# Patient Record
Sex: FEMALE | Race: OTHER | NOT HISPANIC OR LATINO | URBAN - METROPOLITAN AREA
[De-identification: names, ages, dates, MRNs, and addresses within clinical notes are randomized per-mention and may not be internally consistent; named-entity substitution may affect disease eponyms.]

---

## 2017-10-23 ENCOUNTER — INPATIENT (INPATIENT)
Facility: HOSPITAL | Age: 32
LOS: 1 days | Discharge: HOME | DRG: 880 | End: 2017-10-25
Attending: PSYCHIATRY & NEUROLOGY | Admitting: PSYCHIATRY & NEUROLOGY
Payer: COMMERCIAL

## 2017-10-23 VITALS
WEIGHT: 160.06 LBS | SYSTOLIC BLOOD PRESSURE: 128 MMHG | HEART RATE: 112 BPM | OXYGEN SATURATION: 99 % | DIASTOLIC BLOOD PRESSURE: 87 MMHG | TEMPERATURE: 98 F | RESPIRATION RATE: 16 BRPM

## 2017-10-23 DIAGNOSIS — R41.3 OTHER AMNESIA: ICD-10-CM

## 2017-10-23 DIAGNOSIS — R51 HEADACHE: ICD-10-CM

## 2017-10-23 DIAGNOSIS — Z29.9 ENCOUNTER FOR PROPHYLACTIC MEASURES, UNSPECIFIED: ICD-10-CM

## 2017-10-23 DIAGNOSIS — R63.8 OTHER SYMPTOMS AND SIGNS CONCERNING FOOD AND FLUID INTAKE: ICD-10-CM

## 2017-10-23 DIAGNOSIS — R11.0 NAUSEA: ICD-10-CM

## 2017-10-23 LAB
ALBUMIN SERPL ELPH-MCNC: 4.4 G/DL — SIGNIFICANT CHANGE UP (ref 3.3–5)
ALP SERPL-CCNC: 79 U/L — SIGNIFICANT CHANGE UP (ref 40–120)
ALT FLD-CCNC: 22 U/L — SIGNIFICANT CHANGE UP (ref 10–45)
ANION GAP SERPL CALC-SCNC: 20 MMOL/L — HIGH (ref 5–17)
APPEARANCE UR: (no result)
AST SERPL-CCNC: 27 U/L — SIGNIFICANT CHANGE UP (ref 10–40)
BASOPHILS NFR BLD AUTO: 0.4 % — SIGNIFICANT CHANGE UP (ref 0–2)
BILIRUB SERPL-MCNC: 0.4 MG/DL — SIGNIFICANT CHANGE UP (ref 0.2–1.2)
BILIRUB UR-MCNC: (no result)
BUN SERPL-MCNC: 11 MG/DL — SIGNIFICANT CHANGE UP (ref 7–23)
CALCIUM SERPL-MCNC: 10.1 MG/DL — SIGNIFICANT CHANGE UP (ref 8.4–10.5)
CHLORIDE SERPL-SCNC: 98 MMOL/L — SIGNIFICANT CHANGE UP (ref 96–108)
CO2 SERPL-SCNC: 21 MMOL/L — LOW (ref 22–31)
COLOR SPEC: YELLOW — SIGNIFICANT CHANGE UP
CREAT SERPL-MCNC: 0.87 MG/DL — SIGNIFICANT CHANGE UP (ref 0.5–1.3)
DIFF PNL FLD: (no result)
EOSINOPHIL NFR BLD AUTO: 1.6 % — SIGNIFICANT CHANGE UP (ref 0–6)
GLUCOSE SERPL-MCNC: 99 MG/DL — SIGNIFICANT CHANGE UP (ref 70–99)
GLUCOSE UR QL: NEGATIVE — SIGNIFICANT CHANGE UP
HCG SERPL-ACNC: <.1 MIU/ML — SIGNIFICANT CHANGE UP
HCT VFR BLD CALC: 44 % — SIGNIFICANT CHANGE UP (ref 34.5–45)
HGB BLD-MCNC: 14.2 G/DL — SIGNIFICANT CHANGE UP (ref 11.5–15.5)
KETONES UR-MCNC: >=80 MG/DL
LACTATE SERPL-SCNC: 0.8 MMOL/L — SIGNIFICANT CHANGE UP (ref 0.5–2)
LEUKOCYTE ESTERASE UR-ACNC: (no result)
LYMPHOCYTES # BLD AUTO: 25.1 % — SIGNIFICANT CHANGE UP (ref 13–44)
MCHC RBC-ENTMCNC: 26.3 PG — LOW (ref 27–34)
MCHC RBC-ENTMCNC: 32.3 G/DL — SIGNIFICANT CHANGE UP (ref 32–36)
MCV RBC AUTO: 81.5 FL — SIGNIFICANT CHANGE UP (ref 80–100)
MONOCYTES NFR BLD AUTO: 5 % — SIGNIFICANT CHANGE UP (ref 2–14)
NEUTROPHILS NFR BLD AUTO: 67.9 % — SIGNIFICANT CHANGE UP (ref 43–77)
NITRITE UR-MCNC: NEGATIVE — SIGNIFICANT CHANGE UP
PCP SPEC-MCNC: SIGNIFICANT CHANGE UP
PH UR: 6 — SIGNIFICANT CHANGE UP (ref 5–8)
PLATELET # BLD AUTO: 319 K/UL — SIGNIFICANT CHANGE UP (ref 150–400)
POTASSIUM SERPL-MCNC: 4.2 MMOL/L — SIGNIFICANT CHANGE UP (ref 3.5–5.3)
POTASSIUM SERPL-SCNC: 4.2 MMOL/L — SIGNIFICANT CHANGE UP (ref 3.5–5.3)
PROT SERPL-MCNC: 8.8 G/DL — HIGH (ref 6–8.3)
PROT UR-MCNC: 30 MG/DL
RBC # BLD: 5.4 M/UL — HIGH (ref 3.8–5.2)
RBC # FLD: 13.1 % — SIGNIFICANT CHANGE UP (ref 10.3–16.9)
SODIUM SERPL-SCNC: 139 MMOL/L — SIGNIFICANT CHANGE UP (ref 135–145)
SP GR SPEC: 1.02 — SIGNIFICANT CHANGE UP (ref 1–1.03)
UROBILINOGEN FLD QL: 0.2 E.U./DL — SIGNIFICANT CHANGE UP
WBC # BLD: 10.2 K/UL — SIGNIFICANT CHANGE UP (ref 3.8–10.5)
WBC # FLD AUTO: 10.2 K/UL — SIGNIFICANT CHANGE UP (ref 3.8–10.5)

## 2017-10-23 PROCEDURE — 93010 ELECTROCARDIOGRAM REPORT: CPT

## 2017-10-23 PROCEDURE — 70551 MRI BRAIN STEM W/O DYE: CPT | Mod: 26

## 2017-10-23 PROCEDURE — 99285 EMERGENCY DEPT VISIT HI MDM: CPT | Mod: 25

## 2017-10-23 PROCEDURE — 70450 CT HEAD/BRAIN W/O DYE: CPT | Mod: 26

## 2017-10-23 RX ORDER — SODIUM CHLORIDE 9 MG/ML
1000 INJECTION INTRAMUSCULAR; INTRAVENOUS; SUBCUTANEOUS ONCE
Qty: 0 | Refills: 0 | Status: COMPLETED | OUTPATIENT
Start: 2017-10-23 | End: 2017-10-23

## 2017-10-23 RX ORDER — KETOROLAC TROMETHAMINE 30 MG/ML
30 SYRINGE (ML) INJECTION ONCE
Qty: 0 | Refills: 0 | Status: DISCONTINUED | OUTPATIENT
Start: 2017-10-23 | End: 2017-10-23

## 2017-10-23 RX ORDER — FAMOTIDINE 10 MG/ML
20 INJECTION INTRAVENOUS ONCE
Qty: 0 | Refills: 0 | Status: COMPLETED | OUTPATIENT
Start: 2017-10-23 | End: 2017-10-23

## 2017-10-23 RX ORDER — CEFTRIAXONE 500 MG/1
1 INJECTION, POWDER, FOR SOLUTION INTRAMUSCULAR; INTRAVENOUS ONCE
Qty: 0 | Refills: 0 | Status: COMPLETED | OUTPATIENT
Start: 2017-10-23 | End: 2017-10-23

## 2017-10-23 RX ORDER — KETOROLAC TROMETHAMINE 30 MG/ML
30 SYRINGE (ML) INJECTION EVERY 6 HOURS
Qty: 0 | Refills: 0 | Status: DISCONTINUED | OUTPATIENT
Start: 2017-10-23 | End: 2017-10-25

## 2017-10-23 RX ORDER — ONDANSETRON 8 MG/1
4 TABLET, FILM COATED ORAL EVERY 6 HOURS
Qty: 0 | Refills: 0 | Status: DISCONTINUED | OUTPATIENT
Start: 2017-10-23 | End: 2017-10-25

## 2017-10-23 RX ORDER — METOCLOPRAMIDE HCL 10 MG
10 TABLET ORAL ONCE
Qty: 0 | Refills: 0 | Status: COMPLETED | OUTPATIENT
Start: 2017-10-23 | End: 2017-10-23

## 2017-10-23 RX ORDER — ONDANSETRON 8 MG/1
4 TABLET, FILM COATED ORAL EVERY 6 HOURS
Qty: 0 | Refills: 0 | Status: DISCONTINUED | OUTPATIENT
Start: 2017-10-23 | End: 2017-10-23

## 2017-10-23 RX ADMIN — Medication 30 MILLIGRAM(S): at 22:22

## 2017-10-23 RX ADMIN — SODIUM CHLORIDE 1000 MILLILITER(S): 9 INJECTION INTRAMUSCULAR; INTRAVENOUS; SUBCUTANEOUS at 22:22

## 2017-10-23 RX ADMIN — Medication 10 MILLIGRAM(S): at 20:50

## 2017-10-23 RX ADMIN — FAMOTIDINE 20 MILLIGRAM(S): 10 INJECTION INTRAVENOUS at 20:49

## 2017-10-23 RX ADMIN — SODIUM CHLORIDE 1000 MILLILITER(S): 9 INJECTION INTRAMUSCULAR; INTRAVENOUS; SUBCUTANEOUS at 20:50

## 2017-10-23 RX ADMIN — CEFTRIAXONE 100 GRAM(S): 500 INJECTION, POWDER, FOR SOLUTION INTRAMUSCULAR; INTRAVENOUS at 22:22

## 2017-10-23 NOTE — ED ADULT NURSE NOTE - OBJECTIVE STATEMENT
c/o of right sided headache since friday. pt reports pain is strong and constant.  reports blurred vision and nausea. pt says her  notices that she has been forgetting things since Friday too where she seems to be confused. pt cannot remember any of her confusion episodes.  pt also has pain to epigastric area since friday that is non radiating.  denies dizziness, cp, sob.

## 2017-10-23 NOTE — ED PROVIDER NOTE - ATTENDING CONTRIBUTION TO CARE
32F with headache for 4 days, has had similar ha previously. states she has confusion for past 2 days; couldn't remember password to email. Alert and oriented x 3 currently. Answers questions appropriately. As per , pt fainted two days ago. Pt with bump on her forehead. Neuro exam now alert and oriented x 3, cn grossly intact, strength equal in all 4 ext, sensation intact to light touch, pt is not pregnant, gait steady, cerebellar fnf/hts intact. No travel, no URI sx, no fever.

## 2017-10-23 NOTE — H&P ADULT - PROBLEM SELECTOR PLAN 1
Began suddenly on Friday with associate nausea and blurry vision  - CT head negative for acute pathology or intracranial hemorrhage    - Awaiting MRI brain for further evaluation  - Continue Toradol for pain control Began suddenly on Friday with associate nausea and blurry vision  - CT head negative for acute pathology or intracranial hemorrhage  - Possibly new onset of migraine  - Awaiting MRI brain for further evaluation  - Continue Toradol for pain control Began suddenly on Friday with associated nausea and blurry vision  - CT head negative for acute pathology or intracranial hemorrhage  - Possibly new onset of migraine  - Awaiting MRI brain for further evaluation  - Continue Toradol for pain control

## 2017-10-23 NOTE — H&P ADULT - NSHPLABSRESULTS_GEN_ALL_CORE
14.2   10.2  )-----------( 319      ( 23 Oct 2017 18:55 )             44.0     10-23    139  |  98  |  11  ----------------------------<  99  4.2   |  21<L>  |  0.87    Ca    10.1      23 Oct 2017 18:55    TPro  8.8<H>  /  Alb  4.4  /  TBili  0.4  /  DBili  x   /  AST  27  /  ALT  22  /  AlkPhos  79  10-23      TSH 1.48    Lactate 0.8    UA: +leuk esterase +WBC +ketones    UTox negative 14.2   10.2  )-----------( 319      ( 23 Oct 2017 18:55 )             44.0     10-23    139  |  98  |  11  ----------------------------<  99  4.2   |  21<L>  |  0.87    Ca    10.1      23 Oct 2017 18:55    TPro  8.8<H>  /  Alb  4.4  /  TBili  0.4  /  DBili  x   /  AST  27  /  ALT  22  /  AlkPhos  79  10-23      TSH 1.48    Lactate 0.8    UA: +leuk esterase +WBC +ketones    UTox negative      Imaging:    CT head: 14.2   10.2  )-----------( 319      ( 23 Oct 2017 18:55 )             44.0     10-23    139  |  98  |  11  ----------------------------<  99  4.2   |  21<L>  |  0.87    Ca    10.1      23 Oct 2017 18:55    TPro  8.8<H>  /  Alb  4.4  /  TBili  0.4  /  DBili  x   /  AST  27  /  ALT  22  /  AlkPhos  79  10-23      TSH 1.48    Lactate 0.8    UA: +leuk esterase +WBC +ketones    UTox negative      Imaging:    CT head:  The CT examination demonstrates the ventricles, cisternal   spaces, and cortical sulci to be within normal limits. There is no   midline shift or extra axial collections. The gray white differentiation   appears within normal limits. There is no intracranial hemorrhage or   acute transcortical infarct. The bony windows demonstrates no fractures.   The visualized paranasal sinuses are within normal limits. The mastoid   air cells are well aerated.

## 2017-10-23 NOTE — ED PROVIDER NOTE - MEDICAL DECISION MAKING DETAILS
33 y/o f no pmh presents with headache, confusion; no focal deficits in ED, pt is A&Ox3 currently.  Will get labs, CT head.  Abdomen is nontender, will give pepcid.  F/u results and reassess.

## 2017-10-23 NOTE — ED PROVIDER NOTE - PSYCHIATRIC, MLM
Alert and oriented to person, place, time/situation. normal mood and affect. no apparent risk to self or others.
none

## 2017-10-23 NOTE — H&P ADULT - ATTENDING COMMENTS
Ms. Baca reports headache for the past few days, along with memory loss for events occurring after January 2015. She met her  after this date, and states she does not recognize him although has a "feeling" she knows him. Her mother told her she and her  have been arguing, and that the problem is "on his end". By this afternoon headache had resolved. CT and MRI were normal, as are vitals and labs. She had an episode of leg weakness several years ago, and was worked up with MRIs which did not show anything as far as she can remember. She was in medical school, but now works as a teacher. Her  told her that a few days ago she fainted / lost consciousness; she was upset that he didn't bring her to the hospital at that time. Neurologic exam is normal, except for MOCA 16/30, but on many items she did not appear to give full effort.     The characteristics of her memory loss are suspicious for a nonorganic etiology, given the abrupt temporal cut-off as well as the fact that she can remember things people told her in the past few days, but not events that occurred in the same period of time. Furthermore, the prior episode of leg weakness with normal imaging is suggestive of conversion disorder. Finally, there appears to be significant interpersonal conflict with her  which may have precipitated this episode.     Will get video EEG given reported loss of consciousness. Psychiatry consult and social work evaluation will also be requested.

## 2017-10-23 NOTE — H&P ADULT - FAMILY HISTORY
Uncle  Still living? Unknown  Family history of MI (myocardial infarction), Age at diagnosis: Age Unknown     Grandparent  Still living? Unknown  Family history of stroke, Age at diagnosis: Age Unknown  Family history of diabetes mellitus (DM), Age at diagnosis: Age Unknown

## 2017-10-23 NOTE — H&P ADULT - PROBLEM SELECTOR PLAN 4
SCDs UA +leuk esterase and +WBC and patient with lower abdominal pain  - Lactate 0.8  - Continue ceftriaxone 1g QD  - f/u urine culture UA +leuk esterase and +WBC and patient with lower abdominal pain  - Lactate 0.8, afebrile, no leukocytosis   - Continue ceftriaxone 1g QD  - f/u urine culture

## 2017-10-23 NOTE — ED PROVIDER NOTE - PROGRESS NOTE DETAILS
CT head neg, labs wnl, discussed case with on call neurology attending Dr. West, will admit, MRI ordered.

## 2017-10-23 NOTE — H&P ADULT - NSHPPHYSICALEXAM_GEN_ALL_CORE
PHYSICAL EXAM:      Constitutional:    Eyes:    ENMT:    Neck:    Breasts:    Back:    Respiratory:    Cardiovascular:    Gastrointestinal:    Genitourinary:    Rectal:    Extremities:    Vascular:    Neurological:    Skin:    Lymph Nodes:    Musculoskeletal:    Psychiatric: PHYSICAL EXAM:    Constitutional: laying in bed, NAD  Eyes: PERRLA  ENMT: MMM, no LAD, no JVD  Neck: no nuchal rigidity  Respiratory: CTABL  Cardiovascular: RRR, S1/S2, no murmurs  Gastrointestinal: +BS,     Genitourinary:    Rectal:    Extremities:    Vascular:    Neurological:    Skin:    Lymph Nodes:    Musculoskeletal:    Psychiatric: PHYSICAL EXAM:    Constitutional: laying in bed, NAD  Eyes: PERRLA  ENMT: MMM, no LAD, no JVD  Neck: no nuchal rigidity  Respiratory: CTABL  Cardiovascular: RRR, S1/S2, no murmurs  Gastrointestinal: +BS, tender to palpation in epigastrum and LLQ, no rebound or guarding  Extremities: WWP, no edema  Neurological: A&O x3, speech is clear and fluent  CN II: Visual fields are full to confrontation. Pupils are 4 mm and briskly reactive to light.   CN III, IV, VI: At primary gaze, there is no eye deviation. When the patient is looking to the left, the right eye does not adduct. When the patient is looking up, the right eye does not move up as well as the left. She develops horizontal diplopia in all directions of gaze especially when looking to the left. There is ptosis of the right eye. Convergence is impaired.  CN V: Facial sensation is decreased on the right in the region of the ophthalmic nerve.  CN VII: Face is symmetric with normal eye closure and smile.  CN VII: Hearing is normal to rubbing fingers.  CN IX, X: Palate elevates symmetrically. Phonation is normal.  CN XI: Head turning and shoulder shrug are intact  CN XII: Tongue is midline with normal movements and no atrophy.  Skin: no lesions or rashes PHYSICAL EXAM:    Constitutional: laying in bed, NAD  Eyes: PERRLA  ENMT: MMM, no LAD, no JVD  Neck: no nuchal rigidity  Respiratory: CTABL  Cardiovascular: RRR, S1/S2, no murmurs  Gastrointestinal: +BS, tender to palpation in epigastrum and LLQ, no rebound or guarding  Extremities: WWP, no edema  Neurological: A&O x3, speech is clear and fluent  CN II: Visual fields are full to confrontation. Pupils are 4 mm and briskly reactive to light.   CN III, IV, VI: At primary gaze, there is no eye deviation. When the patient is looking to the left, the right eye does not adduct. When the patient is looking up, the right eye does not move up as well as the left. She develops horizontal diplopia in all directions of gaze especially when looking to the left. There is ptosis of the right eye. Convergence is impaired.  CN V: Facial sensation is decreased on the right in the region of the ophthalmic nerve.  CN VII: Face is symmetric with normal eye closure and smile.  CN VII: Hearing is normal to rubbing fingers.  CN IX, X: Palate elevates symmetrically. Phonation is normal.  CN XI: Head turning and shoulder shrug are intact  CN XII: Tongue is midline with normal movements and no atrophy.  Motor: Muscle bulk and tone are normal. Strength is full bilaterally in all extremities.  Gait: deferred  Skin: no lesions or rashes

## 2017-10-23 NOTE — H&P ADULT - PROBLEM SELECTOR PLAN 2
- Began with headache onset  - Differential includes hypothyroidsim, depression - Began with headache onset  - Differential includes hypothyroidism, depression, vitamin deficiencies  - TSH normal  - Will check Vitamin b12 - Began with headache onset  - Broad differential including hypothyroidism, depression, vitamin deficiencies, medications, toxins, head trauma, seizure  - CT head normal, awaiting MRI  - Patient does not take any prescription medications  - Denies any drug use or known toxin ingestion  - Denies any head trauma  - No history of seizures  - TSH normal  - Will check Vitamin b12 - Began with headache onset  - Broad differential including hypothyroidism, depression, vitamin deficiencies, medications, toxins, head trauma, seizure  - CT head normal, awaiting MRI  - Patient does not take any prescription medications  - Denies any drug use or known toxin ingestion  - Denies any head trauma  - No history of seizures  - TSH normal  - Will check Vitamin B1 and B12

## 2017-10-23 NOTE — H&P ADULT - NSHPREVIEWOFSYSTEMS_GEN_ALL_CORE
REVIEW OF SYSTEMS:    CONSTITUTIONAL: No weakness, fevers or chills  EYES/ENT: No visual changes;  No vertigo or throat pain   NECK: No pain or stiffness  RESPIRATORY: No cough, wheezing, hemoptysis; No shortness of breath  CARDIOVASCULAR: No chest pain or palpitations  GASTROINTESTINAL: No abdominal or epigastric pain. No nausea, vomiting, or hematemesis; No diarrhea or constipation. No melena or hematochezia.  GENITOURINARY: No dysuria, frequency or hematuria  NEUROLOGICAL: No numbness or weakness  SKIN: No itching, burning, rashes, or lesions   All other review of systems is negative unless indicated above. REVIEW OF SYSTEMS:    CONSTITUTIONAL: No weakness, + fevers +chills  EYES/ENT: +blurry vision on right;  No vertigo or throat pain   NECK: No pain or stiffness  RESPIRATORY: No cough, wheezing, hemoptysis; No shortness of breath  CARDIOVASCULAR: No chest pain or palpitations  GASTROINTESTINAL: +epigastric pain, + nausea; No diarrhea or constipation. No melena or hematochezia.  GENITOURINARY: No dysuria, frequency or hematuria  NEUROLOGICAL: No numbness or weakness  SKIN: No itching, burning, rashes, or lesions   All other review of systems is negative unless indicated above.

## 2017-10-23 NOTE — H&P ADULT - ASSESSMENT
33 yo F with no significant PMH present with right-sided headache that began Friday associated with memory loss.

## 2017-10-23 NOTE — ED PROVIDER NOTE - OBJECTIVE STATEMENT
33 y/o f presents c/o headache for the past 4 days.  Pt stating she has gradual onset headache, more right than left, with nausea and photophobia.  Pt also reporting some confusion, forgetfulness.  Pt stating she couldn't remember meeting her , couldn't remember password to her email.  Pt also states her  told her she fainted 2 days ago at home, witnessed by .  Pt stating in ED, has headache which has been persistent despite taking ibuprofen.  Pt also stating she is having some epigastric pain, which has been present for the past 3 days, states her  told her she has been reporting it for several months.  Denies visual changes, dizziness, vomiting, fever, recent travel, all other ROS negative.

## 2017-10-23 NOTE — H&P ADULT - HISTORY OF PRESENT ILLNESS
31 yo F with no significant PMH present with headache that began Friday associated with memory loss.  Patient does not remember all the events surrounding her symptoms but states the headache came on suddenly on Friday and has gotten progressively worse since then.  She had one episode of NBNB vomiting when the HA began and has felt nausea and some abdominal pain since that time.  She says the headache is right-sided on the frontal aspect of her head and associated with blurry vision in the right eye and some tearing.  It is a 10/10 in severity at its worst.  She took ibuprofen at home without relief.  Also reports that she does not remember her  who is sitting at her bedside.  She cannot recall meeting him in March 2016 and does not remember their wedding.  She knows today's date because she has been told what it is but her memories since 2015 are blurry.  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  She also reports forgetting the password to her phone.  She was hospitalized in 2008 at [facility] for progressive weakness of her entire body and was worked up for Multiple sclerosis but work up has been negative.  She has had several MRIs of the spine which have been unrevealing per the patient.  She see a neurologist Dr. Tomy Collins but has not been diagnosed with any neurologic disease.  Per her , only travel has been to the [facility] in November 2016 and this past summer to [place].  Denies any sick contacts.  Patient reports feeling F/C today.  No dizziness, weakness, CP, SOB, diarrhea/constipation, or dysuria.      In ED, T 98  /87 RR 16 O2sat 99% on RA.  She received 2L NS bolus, Pepcid Toradol and Reglan.

## 2017-10-23 NOTE — ED ADULT TRIAGE NOTE - CHIEF COMPLAINT QUOTE
Pt with no PMH and not on daily meds presents to ED c/o R sided frontal HA since Friday, also states " my  told me I was confused and didn't remember how we met" pt also c/o  abdominal pain and has a mass to R forehead that she state she has had for a year

## 2017-10-24 DIAGNOSIS — N30.00 ACUTE CYSTITIS WITHOUT HEMATURIA: ICD-10-CM

## 2017-10-24 LAB
ANION GAP SERPL CALC-SCNC: 13 MMOL/L — SIGNIFICANT CHANGE UP (ref 5–17)
BUN SERPL-MCNC: 10 MG/DL — SIGNIFICANT CHANGE UP (ref 7–23)
CALCIUM SERPL-MCNC: 8.6 MG/DL — SIGNIFICANT CHANGE UP (ref 8.4–10.5)
CHLORIDE SERPL-SCNC: 102 MMOL/L — SIGNIFICANT CHANGE UP (ref 96–108)
CO2 SERPL-SCNC: 23 MMOL/L — SIGNIFICANT CHANGE UP (ref 22–31)
CREAT SERPL-MCNC: 0.73 MG/DL — SIGNIFICANT CHANGE UP (ref 0.5–1.3)
GLUCOSE SERPL-MCNC: 145 MG/DL — HIGH (ref 70–99)
HCT VFR BLD CALC: 41.5 % — SIGNIFICANT CHANGE UP (ref 34.5–45)
HGB BLD-MCNC: 13.1 G/DL — SIGNIFICANT CHANGE UP (ref 11.5–15.5)
HIV 1+2 AB+HIV1 P24 AG SERPL QL IA: SIGNIFICANT CHANGE UP
MAGNESIUM SERPL-MCNC: 2 MG/DL — SIGNIFICANT CHANGE UP (ref 1.6–2.6)
MCHC RBC-ENTMCNC: 26.1 PG — LOW (ref 27–34)
MCHC RBC-ENTMCNC: 31.6 G/DL — LOW (ref 32–36)
MCV RBC AUTO: 82.7 FL — SIGNIFICANT CHANGE UP (ref 80–100)
PLATELET # BLD AUTO: 276 K/UL — SIGNIFICANT CHANGE UP (ref 150–400)
POTASSIUM SERPL-MCNC: 3.9 MMOL/L — SIGNIFICANT CHANGE UP (ref 3.5–5.3)
POTASSIUM SERPL-SCNC: 3.9 MMOL/L — SIGNIFICANT CHANGE UP (ref 3.5–5.3)
RBC # BLD: 5.02 M/UL — SIGNIFICANT CHANGE UP (ref 3.8–5.2)
RBC # FLD: 13.3 % — SIGNIFICANT CHANGE UP (ref 10.3–16.9)
SODIUM SERPL-SCNC: 138 MMOL/L — SIGNIFICANT CHANGE UP (ref 135–145)
VIT B12 SERPL-MCNC: 479 PG/ML — SIGNIFICANT CHANGE UP (ref 243–894)
WBC # BLD: 6.2 K/UL — SIGNIFICANT CHANGE UP (ref 3.8–10.5)
WBC # FLD AUTO: 6.2 K/UL — SIGNIFICANT CHANGE UP (ref 3.8–10.5)

## 2017-10-24 PROCEDURE — 99223 1ST HOSP IP/OBS HIGH 75: CPT

## 2017-10-24 PROCEDURE — 95951: CPT | Mod: 26,52

## 2017-10-24 RX ORDER — CEFTRIAXONE 500 MG/1
1 INJECTION, POWDER, FOR SOLUTION INTRAMUSCULAR; INTRAVENOUS EVERY 24 HOURS
Qty: 0 | Refills: 0 | Status: DISCONTINUED | OUTPATIENT
Start: 2017-10-24 | End: 2017-10-25

## 2017-10-24 RX ORDER — INFLUENZA VIRUS VACCINE 15; 15; 15; 15 UG/.5ML; UG/.5ML; UG/.5ML; UG/.5ML
0.5 SUSPENSION INTRAMUSCULAR ONCE
Qty: 0 | Refills: 0 | Status: COMPLETED | OUTPATIENT
Start: 2017-10-24 | End: 2017-10-24

## 2017-10-24 RX ADMIN — CEFTRIAXONE 100 GRAM(S): 500 INJECTION, POWDER, FOR SOLUTION INTRAMUSCULAR; INTRAVENOUS at 21:47

## 2017-10-24 RX ADMIN — Medication 30 MILLIGRAM(S): at 00:02

## 2017-10-24 NOTE — PROGRESS NOTE ADULT - ASSESSMENT
31 yo F with no significant PMH present with right-sided headache that began Friday associated with memory loss. Imaging negative to date.

## 2017-10-24 NOTE — PROGRESS NOTE ADULT - PROBLEM SELECTOR PLAN 2
- Began with headache onset  - Broad differential including hypothyroidism, depression, vitamin deficiencies, medications, toxins, head trauma, seizure  - CT head, MRI wnl.  - Patient does not take any prescription medications  - Denies any drug use or known toxin ingestion  - Denies any head trauma  - No history of seizures  - TSH normal  - f/u Vitamin B1 and B12  - f/u VEEG.   - Psychiatry consulted, appreciate recs.  - SW consulted, some concern for unsafe home environment. -unlikely to be from organic etiology given characteristics  - CT head, MRI wnl.  - Patient does not take any prescription medications  - Denies any drug use or known toxin ingestion  - Denies any head trauma  - No history of seizures  - TSH normal  - f/u Vitamin B1 and B12  - f/u VEEG.   - Psychiatry consulted, appreciate recs.  - SW consulted, some concern for unsafe home environment.

## 2017-10-24 NOTE — PROGRESS NOTE ADULT - PROBLEM SELECTOR PLAN 4
UA +leuk esterase and +WBC and patient with lower abdominal pain  - Lactate 0.8, afebrile, no leukocytosis   - Continue ceftriaxone 1g QD  - f/u urine culture

## 2017-10-24 NOTE — PROGRESS NOTE ADULT - PROBLEM SELECTOR PLAN 1
Began suddenly on Friday with associated nausea and blurry vision  - CT head and MRI negative for acute pathology or intracranial hemorrhage  - Possibly new onset of migraine  - Headache resolved. Continue Toradol prn for pain control Began suddenly on Friday with associated nausea and blurry vision  - CT head and MRI negative for acute pathology or intracranial hemorrhage  - Headache resolved. Continue Toradol prn for pain control

## 2017-10-24 NOTE — PROGRESS NOTE ADULT - SUBJECTIVE AND OBJECTIVE BOX
SUBJECTIVE / INTERVAL HPI: Patient seen and examined at bedside.     VITAL SIGNS:  Vital Signs Last 24 Hrs  T(C): 36.1 (24 Oct 2017 12:03), Max: 96.7 (24 Oct 2017 01:35)  T(F): 97 (24 Oct 2017 12:03), Max: 206.1 (24 Oct 2017 01:35)  HR: 96 (24 Oct 2017 12:03) (74 - 112)  BP: 122/86 (24 Oct 2017 12:03) (111/69 - 128/87)  BP(mean): --  RR: 16 (24 Oct 2017 12:03) (15 - 18)  SpO2: 97% (24 Oct 2017 12:03) (97% - 99%)    PHYSICAL EXAM:    General: WDWN  HEENT: NC/AT; PERRL, clear conjunctiva  Neck: supple  Cardiovascular: +S1/S2; RRR  Respiratory: CTA b/l; no W/R/R  Gastrointestinal: soft, NT/ND; +BSx4  Extremities: WWP; 2+ peripheral pulses; no edema   Neurological: AAOx3; no focal deficits    MEDICATIONS:  MEDICATIONS  (STANDING):  cefTRIAXone   IVPB 1 Gram(s) IV Intermittent every 24 hours  influenza   Vaccine 0.5 milliLiter(s) IntraMuscular once    MEDICATIONS  (PRN):  ketorolac   Injectable 30 milliGRAM(s) IV Push every 6 hours PRN Severe Pain (7 - 10)  ondansetron Injectable 4 milliGRAM(s) IV Push every 6 hours PRN Nausea and/or Vomiting      ALLERGIES:  Allergies    No Known Allergies    Intolerances        LABS:                        13.1   6.2   )-----------( 276      ( 24 Oct 2017 08:48 )             41.5     10-    138  |  102  |  10  ----------------------------<  145<H>  3.9   |  23  |  0.73    Ca    8.6      24 Oct 2017 08:47  Mg     2.0     10-    TPro  8.8<H>  /  Alb  4.4  /  TBili  0.4  /  DBili  x   /  AST  27  /  ALT  22  /  AlkPhos  79  10-      Urinalysis Basic - ( 23 Oct 2017 21:21 )    Color: Yellow / Appearance: Cloudy / S.025 / pH: x  Gluc: x / Ketone: >=80 mg/dL  / Bili: Small / Urobili: 0.2 E.U./dL   Blood: x / Protein: 30 mg/dL / Nitrite: NEGATIVE   Leuk Esterase: Large / RBC: 5-10 /HPF / WBC Many /HPF   Sq Epi: x / Non Sq Epi: Moderate /HPF / Bacteria: Present /HPF      CAPILLARY BLOOD GLUCOSE      POCT Blood Glucose.: 98 mg/dL (23 Oct 2017 18:27)      RADIOLOGY & ADDITIONAL TESTS: Reviewed. SUBJECTIVE / INTERVAL HPI: No major overnight events. Patient seen and examined at bedside. Continues to report the same history of memory loss of specific events including meeting and marrying her . Reports minimal epigastric pain. Headache has improved. No F/C/N/V/Dizziness/Fatigue. ROS otherwise negative.    VITAL SIGNS:  Vital Signs Last 24 Hrs  T(C): 36.1 (24 Oct 2017 12:03), Max: 96.7 (24 Oct 2017 01:35)  T(F): 97 (24 Oct 2017 12:03), Max: 206.1 (24 Oct 2017 01:35)  HR: 96 (24 Oct 2017 12:03) (74 - 112)  BP: 122/86 (24 Oct 2017 12:03) (111/69 - 128/87)  BP(mean): --  RR: 16 (24 Oct 2017 12:03) (15 - 18)  SpO2: 97% (24 Oct 2017 12:03) (97% - 99%)    PHYSICAL EXAM:    General: WDWN  HEENT: NC/AT; PERRL, clear conjunctiva  Neck: supple  Cardiovascular: +S1/S2; RRR  Respiratory: CTA b/l; no W/R/R  Gastrointestinal: soft, NT. Mildly distended.; +BSx4  Extremities: WWP; 2+ peripheral pulses; no edema   Neurological: AAOx3; no focal deficits    MEDICATIONS:  MEDICATIONS  (STANDING):  cefTRIAXone   IVPB 1 Gram(s) IV Intermittent every 24 hours  influenza   Vaccine 0.5 milliLiter(s) IntraMuscular once    MEDICATIONS  (PRN):  ketorolac   Injectable 30 milliGRAM(s) IV Push every 6 hours PRN Severe Pain (7 - 10)  ondansetron Injectable 4 milliGRAM(s) IV Push every 6 hours PRN Nausea and/or Vomiting      ALLERGIES:  Allergies    No Known Allergies    Intolerances        LABS:                        13.1   6.2   )-----------( 276      ( 24 Oct 2017 08:48 )             41.5     10-24    138  |  102  |  10  ----------------------------<  145<H>  3.9   |  23  |  0.73    Ca    8.6      24 Oct 2017 08:47  Mg     2.0     10-    TPro  8.8<H>  /  Alb  4.4  /  TBili  0.4  /  DBili  x   /  AST  27  /  ALT  22  /  AlkPhos  79  10-      Urinalysis Basic - ( 23 Oct 2017 21:21 )    Color: Yellow / Appearance: Cloudy / S.025 / pH: x  Gluc: x / Ketone: >=80 mg/dL  / Bili: Small / Urobili: 0.2 E.U./dL   Blood: x / Protein: 30 mg/dL / Nitrite: NEGATIVE   Leuk Esterase: Large / RBC: 5-10 /HPF / WBC Many /HPF   Sq Epi: x / Non Sq Epi: Moderate /HPF / Bacteria: Present /HPF      CAPILLARY BLOOD GLUCOSE      POCT Blood Glucose.: 98 mg/dL (23 Oct 2017 18:27)      RADIOLOGY & ADDITIONAL TESTS: Reviewed.    < from: MRI Head w/o Cont (10.23.17 @ 23:31) >  FINDINGS: The MRI examination of the brain demonstrates the ventricles,   cisternal spaces,and cortical sulci to be appropriate for the patient's   stated age. There is no midline shift or extra-axial collection. No   abnormal signal is identified. The diffusion-weighted images demonstrate   no acute ischemia. The GRE images demonstrate nohemorrhagic products.   There is normal vascular flow-voids. The visualized paranasal sinuses are   free of mucosal disease. The mastoid air cells are well-aerated.    IMPRESSION: Unremarkable noncontrast brain MRI.      < end of copied text >      < from: CT Head No Cont (10.23.17 @ 20:39) >  FINDINGS: The CT examination demonstrates the ventricles, cisternal   spaces, and cortical sulci to be within normal limits. There is no  midline shift or extra axial collections. The gray white differentiation   appears within normal limits. There is no intracranial hemorrhage or   acute transcortical infarct. The bony windows demonstrates no fractures.   The visualized paranasal sinuses are within normal limits. The mastoid   air cells are well aerated.    IMPRESSION: Unremarkable noncontrast head CT.    < end of copied text >

## 2017-10-24 NOTE — ED ADULT NURSE REASSESSMENT NOTE - NS ED NURSE REASSESS COMMENT FT1
comfortable, awaiting transport to floor
pt to CT scan, will assess when she returns to exam room
headache improved, tolerated MRI well
calm, cooperative, medicated for headache, tolerated IVF well. will monitor and reassess

## 2017-10-25 ENCOUNTER — TRANSCRIPTION ENCOUNTER (OUTPATIENT)
Age: 32
End: 2017-10-25

## 2017-10-25 VITALS
TEMPERATURE: 98 F | HEART RATE: 89 BPM | DIASTOLIC BLOOD PRESSURE: 84 MMHG | OXYGEN SATURATION: 97 % | RESPIRATION RATE: 14 BRPM | SYSTOLIC BLOOD PRESSURE: 120 MMHG

## 2017-10-25 LAB
ANION GAP SERPL CALC-SCNC: 14 MMOL/L — SIGNIFICANT CHANGE UP (ref 5–17)
BUN SERPL-MCNC: 13 MG/DL — SIGNIFICANT CHANGE UP (ref 7–23)
CALCIUM SERPL-MCNC: 9.2 MG/DL — SIGNIFICANT CHANGE UP (ref 8.4–10.5)
CHLORIDE SERPL-SCNC: 103 MMOL/L — SIGNIFICANT CHANGE UP (ref 96–108)
CO2 SERPL-SCNC: 22 MMOL/L — SIGNIFICANT CHANGE UP (ref 22–31)
CREAT SERPL-MCNC: 0.76 MG/DL — SIGNIFICANT CHANGE UP (ref 0.5–1.3)
CULTURE RESULTS: SIGNIFICANT CHANGE UP
GLUCOSE SERPL-MCNC: 95 MG/DL — SIGNIFICANT CHANGE UP (ref 70–99)
HCT VFR BLD CALC: 41.6 % — SIGNIFICANT CHANGE UP (ref 34.5–45)
HGB BLD-MCNC: 13.8 G/DL — SIGNIFICANT CHANGE UP (ref 11.5–15.5)
MAGNESIUM SERPL-MCNC: 2.1 MG/DL — SIGNIFICANT CHANGE UP (ref 1.6–2.6)
MCHC RBC-ENTMCNC: 27.2 PG — SIGNIFICANT CHANGE UP (ref 27–34)
MCHC RBC-ENTMCNC: 33.2 G/DL — SIGNIFICANT CHANGE UP (ref 32–36)
MCV RBC AUTO: 81.9 FL — SIGNIFICANT CHANGE UP (ref 80–100)
PLATELET # BLD AUTO: 306 K/UL — SIGNIFICANT CHANGE UP (ref 150–400)
POTASSIUM SERPL-MCNC: 4.2 MMOL/L — SIGNIFICANT CHANGE UP (ref 3.5–5.3)
POTASSIUM SERPL-SCNC: 4.2 MMOL/L — SIGNIFICANT CHANGE UP (ref 3.5–5.3)
RBC # BLD: 5.08 M/UL — SIGNIFICANT CHANGE UP (ref 3.8–5.2)
RBC # FLD: 13.3 % — SIGNIFICANT CHANGE UP (ref 10.3–16.9)
SODIUM SERPL-SCNC: 139 MMOL/L — SIGNIFICANT CHANGE UP (ref 135–145)
SPECIMEN SOURCE: SIGNIFICANT CHANGE UP
WBC # BLD: 6.2 K/UL — SIGNIFICANT CHANGE UP (ref 3.8–10.5)
WBC # FLD AUTO: 6.2 K/UL — SIGNIFICANT CHANGE UP (ref 3.8–10.5)

## 2017-10-25 PROCEDURE — 82607 VITAMIN B-12: CPT

## 2017-10-25 PROCEDURE — 80053 COMPREHEN METABOLIC PANEL: CPT

## 2017-10-25 PROCEDURE — 84702 CHORIONIC GONADOTROPIN TEST: CPT

## 2017-10-25 PROCEDURE — 81001 URINALYSIS AUTO W/SCOPE: CPT

## 2017-10-25 PROCEDURE — 80307 DRUG TEST PRSMV CHEM ANLYZR: CPT

## 2017-10-25 PROCEDURE — 93005 ELECTROCARDIOGRAM TRACING: CPT

## 2017-10-25 PROCEDURE — 85025 COMPLETE CBC W/AUTO DIFF WBC: CPT

## 2017-10-25 PROCEDURE — 82962 GLUCOSE BLOOD TEST: CPT

## 2017-10-25 PROCEDURE — 36415 COLL VENOUS BLD VENIPUNCTURE: CPT

## 2017-10-25 PROCEDURE — 96375 TX/PRO/DX INJ NEW DRUG ADDON: CPT

## 2017-10-25 PROCEDURE — 84425 ASSAY OF VITAMIN B-1: CPT

## 2017-10-25 PROCEDURE — 99223 1ST HOSP IP/OBS HIGH 75: CPT

## 2017-10-25 PROCEDURE — 87086 URINE CULTURE/COLONY COUNT: CPT

## 2017-10-25 PROCEDURE — 96374 THER/PROPH/DIAG INJ IV PUSH: CPT

## 2017-10-25 PROCEDURE — 85027 COMPLETE CBC AUTOMATED: CPT

## 2017-10-25 PROCEDURE — 99285 EMERGENCY DEPT VISIT HI MDM: CPT | Mod: 25

## 2017-10-25 PROCEDURE — 87389 HIV-1 AG W/HIV-1&-2 AB AG IA: CPT

## 2017-10-25 PROCEDURE — 70450 CT HEAD/BRAIN W/O DYE: CPT

## 2017-10-25 PROCEDURE — 99233 SBSQ HOSP IP/OBS HIGH 50: CPT

## 2017-10-25 PROCEDURE — 83605 ASSAY OF LACTIC ACID: CPT

## 2017-10-25 PROCEDURE — 95951: CPT

## 2017-10-25 PROCEDURE — 80048 BASIC METABOLIC PNL TOTAL CA: CPT

## 2017-10-25 PROCEDURE — 83735 ASSAY OF MAGNESIUM: CPT

## 2017-10-25 PROCEDURE — 70551 MRI BRAIN STEM W/O DYE: CPT

## 2017-10-25 PROCEDURE — 84443 ASSAY THYROID STIM HORMONE: CPT

## 2017-10-25 PROCEDURE — 95951: CPT | Mod: 26,52

## 2017-10-25 NOTE — DISCHARGE NOTE ADULT - CARE PLAN
Principal Discharge DX:	Psychogenic amnesia  Goal:	Resolution of amnesia and headache  Instructions for follow-up, activity and diet:	You presented to us with symptoms of amnesia and headache.  While hospitalized, you had a CT head, MRI brain, and a video EEG, all of which were unremarkable.  We did not find an organic cause of your symptoms.  Please follow up with Dr. West, your neurologist 2-4 weeks after discharge.  If your symptoms become acutely worse, please contact his office.  The contact information for Dr. West is in this document.  You should also follow up with  Secondary Diagnosis:	Headache  Instructions for follow-up, activity and diet:	On admission you had complaints of headache.  This has since resolved.  If you have a headache after discharge, please treat it with 650mg of tylenol and do not take more than 3000mg in a 24 hour period. Principal Discharge DX:	Psychogenic amnesia  Goal:	Resolution of amnesia and headache  Instructions for follow-up, activity and diet:	You presented to us with symptoms of amnesia and headache.  While hospitalized, you had a CT head, MRI brain, and a video EEG, all of which were unremarkable.  We did not find an organic cause of your symptoms.  Please follow up with Dr. West, your neurologist 2-4 weeks after discharge.  If your symptoms become acutely worse, please contact his office.  The contact information for Dr. West is in this document.  You should also follow up with Dr. Johnson, neuropsychologist for intake evaluation.  Secondary Diagnosis:	Headache  Instructions for follow-up, activity and diet:	On admission you had complaints of headache.  This has since resolved.  If you have a headache after discharge, please treat it with 650mg of tylenol and do not take more than 3000mg in a 24 hour period.

## 2017-10-25 NOTE — BEHAVIORAL HEALTH ASSESSMENT NOTE - NSBHCONSULTFOLLOWAFTERCARE_PSY_A_CORE FT
- psychotherapy, if pt is amenable as she was, though superficially; referral information provided  - agree with plan of maintaining engagement with pt for support as noted and as general recommendation
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
## 2017-10-25 NOTE — DISCHARGE NOTE ADULT - PLAN OF CARE
Resolution of amnesia and headache You presented to us with symptoms of amnesia and headache.  While hospitalized, you had a CT head, MRI brain, and a video EEG, all of which were unremarkable.  We did not find an organic cause of your symptoms.  Please follow up with Dr. West, your neurologist 2-4 weeks after discharge.  If your symptoms become acutely worse, please contact his office.  The contact information for Dr. West is in this document.  You should also follow up with On admission you had complaints of headache.  This has since resolved.  If you have a headache after discharge, please treat it with 650mg of tylenol and do not take more than 3000mg in a 24 hour period. You presented to us with symptoms of amnesia and headache.  While hospitalized, you had a CT head, MRI brain, and a video EEG, all of which were unremarkable.  We did not find an organic cause of your symptoms.  Please follow up with Dr. West, your neurologist 2-4 weeks after discharge.  If your symptoms become acutely worse, please contact his office.  The contact information for Dr. West is in this document.  You should also follow up with Dr. Johnson, neuropsychologist for intake evaluation.

## 2017-10-25 NOTE — BEHAVIORAL HEALTH ASSESSMENT NOTE - DIFFERENTIAL
Functional Neurologic DIsorder  r/o Dissociative Amnesia  Course, assoc sxs, and criteria for TGA and TIA or of other encephalopath/ encephalitis not evident, along with dx w/u with imaging not suggestive of CVA, and neg vEEG with persistent sxs make epileptic amnesia unlikely

## 2017-10-25 NOTE — DISCHARGE NOTE ADULT - OTHER SIGNIFICANT FINDINGS
Had a long ~30 min discussion with patient and her mother both separately and together. I explained to patient that her symptoms are most likely stress related, but that doesn't make them any less real. The treatment will be psychosocial more than medical, but she should improve with treatment. She will follow up with me and neuropsychologist Dr. Johnson. She refused to f/u with psychiatry at this time although we will try to transition her into that in the future.

## 2017-10-25 NOTE — DISCHARGE NOTE ADULT - MEDICATION SUMMARY - MEDICATIONS TO TAKE
I will START or STAY ON the medications listed below when I get home from the hospital:    prenatal vitamin  -- Indication: For Need for prophylactic measure    vitamin b complex  -- Indication: For Need for prophylactic measure
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
## 2017-10-25 NOTE — BEHAVIORAL HEALTH ASSESSMENT NOTE - OTHER
pending risk of loss/ marital relationship distress interval becoming resistant not assessed inappropriate

## 2017-10-25 NOTE — BEHAVIORAL HEALTH ASSESSMENT NOTE - DESCRIPTION
as noted- diffuse neurological sxs of weakness and/ or hypesthesia, likely 'functional', MS not dx. as noted- diffuse neurological sxs of weakness and/ or hypesthesia/ sensory loss, likely 'functional', MS not dx.

## 2017-10-25 NOTE — DISCHARGE NOTE ADULT - NS AS ACTIVITY OBS
Return to Work/School allowed/Showering allowed/Driving allowed/Walking-Indoors allowed/Walking-Outdoors allowed/Stairs allowed
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
## 2017-10-25 NOTE — BEHAVIORAL HEALTH ASSESSMENT NOTE - SUMMARY
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 weakness and hypesthesia not following grossly known neurophysiologic mechanism corresponding with elicited psychosocial stressor with career which she ultimately changed, though denies and resists psychological factors and therapies, indeed likely contributing to dissociative coping mechanisms as such and with which she seems to be currently presenting with memory loss w/o evidence of organic etiology such as CVA, epiletiform activity as might be seen in rare temporal-lobe sz/ amnia, and atypical in course and criteria for other neurologic conditions such as TIA or amnestic syndromes such as TGA, also corresponding with psychosocial stressor now related to martial issues which may be progressing albeit without further details of such better elucidated, and pt endorsing recent past independent trauma,  corroborating predominant psychological mechanisms such as dissociative amnesia vs. other unspecified functional neurologic disorder.

## 2017-10-25 NOTE — DISCHARGE NOTE ADULT - CARE PROVIDER_API CALL
Ortiz West), Neurology; Neuromuscular Medicine  [address], [place] [address]  Phone: [phone]  Fax: [phone]

## 2017-10-25 NOTE — BEHAVIORAL HEALTH ASSESSMENT NOTE - NSBHSOCIALHXDETAILSFT_PSY_A_CORE
Parents are Joseph, mom in [place] c. 8 or 9 yo, like dad.  1 younger nicky RN, pursuing NP certification of which she reports also not having been aware.  [employer] in [facility] where they live as did she for residency at  where she subsequently worked briefly, all following med school at [employer] in [facility] (and some in [place]) starting 2008. 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

## 2017-10-25 NOTE — DISCHARGE NOTE ADULT - HOSPITAL COURSE
33 yo F with no significant PMH present with headache that began Friday associated with memory loss.  On evaluation, patient under many emotional stressors at home.  Inpatient workup including CT head, MRI brain, VEEG all negative for organic cause.  Patient ready for discharge and going home with her mother.  Will follow up with Dr. West and neuropsych outpatient. 31 yo F with no significant PMH present with headache that began Friday associated with memory loss.  On evaluation, patient under many emotional stressors at home.  Inpatient workup including CT head, MRI brain, VEEG all negative for organic cause.  Patient evaluated by psychiatry,  Patient ready for discharge and going home with her mother.  Will follow up with Dr. West and neuropsych outpatient.

## 2017-10-25 NOTE — DISCHARGE NOTE ADULT - PATIENT PORTAL LINK FT
“You can access the FollowHealth Patient Portal, offered by [facility], by registering with the following website: [email or web address]”

## 2017-10-29 DIAGNOSIS — F44.0 DISSOCIATIVE AMNESIA: ICD-10-CM

## 2017-10-29 DIAGNOSIS — Z28.21 IMMUNIZATION NOT CARRIED OUT BECAUSE OF PATIENT REFUSAL: ICD-10-CM

## 2017-10-29 DIAGNOSIS — N30.00 ACUTE CYSTITIS WITHOUT HEMATURIA: ICD-10-CM

## 2017-10-29 DIAGNOSIS — R51 HEADACHE: ICD-10-CM

## 2017-10-30 PROBLEM — Z00.00 ENCOUNTER FOR PREVENTIVE HEALTH EXAMINATION: Status: ACTIVE | Noted: 2017-10-30

## 2017-10-31 LAB — VIT B1 SERPL-MCNC: 185.1 NMOL/L — SIGNIFICANT CHANGE UP (ref 66.5–200)

## 2017-11-17 ENCOUNTER — APPOINTMENT (OUTPATIENT)
Dept: NEUROLOGY | Facility: CLINIC | Age: 32
End: 2017-11-17

## 2017-11-24 ENCOUNTER — APPOINTMENT (OUTPATIENT)
Dept: NEUROLOGY | Facility: CLINIC | Age: 32
End: 2017-11-24
Payer: COMMERCIAL

## 2017-11-24 VITALS
HEIGHT: 64 IN | OXYGEN SATURATION: 100 % | DIASTOLIC BLOOD PRESSURE: 90 MMHG | HEART RATE: 105 BPM | WEIGHT: 160 LBS | TEMPERATURE: 97.5 F | SYSTOLIC BLOOD PRESSURE: 127 MMHG | BODY MASS INDEX: 27.31 KG/M2

## 2017-11-24 DIAGNOSIS — Z78.9 OTHER SPECIFIED HEALTH STATUS: ICD-10-CM

## 2017-11-24 DIAGNOSIS — R41.3 OTHER AMNESIA: ICD-10-CM

## 2017-11-24 PROCEDURE — 99214 OFFICE O/P EST MOD 30 MIN: CPT

## 2017-11-25 NOTE — ED PROVIDER NOTE - CPE EDP NEURO NORM
normal...
Alert-The patient is alert, awake and responds to voice. The patient is oriented to time, place, and person. The triage nurse is able to obtain subjective information.

## 2019-03-04 PROBLEM — R41.3 MEMORY LOSS: Status: ACTIVE | Noted: 2017-11-24

## 2020-04-05 NOTE — ED ADULT NURSE NOTE - PAIN: PRESENCE, MLM
"  Problem: Depression  Goal: Improved Mood  Description: Pt will report decrease in depression by target date.   Pt will attend 50% of groups.   4/5/2020 1625 by Lisa Arrington RN  Note: Continue to monitor at this time.      Problem: Suicide Risk  Goal: Absence of Self-Harm  Description: Pt will be free from self injury while on the unit.   4/5/2020 1625 by Lisa Arrington, RN  Note: Continue to monitor at this time.      Problem: Adult Behavioral Health Plan of Care  Goal: Patient-Specific Goal (Individualization)  Description: Pt will sleep 6-8 hours per night.   Pt will eat 50% of meals and snacks.   Pt will attend 50% of groups.   4/5/2020 1625 by Lisa Arrington RN  Note: [facility] with peers in beginning of shift. Requesting more information on borderline personality disorder stating, \"I have a check juliane next to every characteristic\". Patient appears more restless after dinner, pacing hallways and withdrawing to bedroom off/on. Reports feeling \"angry\" stating, \"I am just pissed off. I feel like I'm crawling out of my skin.\" Patient appeared agitated with a red face, visible sweating and pressured speech.   1816- Received PRN Atarax 50 mg.   By 1900, patient reports no relief and continues to appear anxious. States, \"I don't feel right, I think it's from the Effexor. On-call provider called at this time.   1926- Received PRN Clonidine 0.1 mg. Back out to [facility] and reports \"some\" relief within the hour. Patient sitting [facility] in [facility], watching television and socializing with peers towards end of shift. Continue to monitor at this time.     Face to face end of shift report communicated to kaycee CULLEN.     Lisa Arrington RN  4/5/2020  11:02 PM         " complains of pain/discomfort

## 2020-10-17 NOTE — DISCHARGE NOTE ADULT - NSTOBACCONEVERSMOKERY/N_GEN_A
Sepsis Sepsis Sepsis Sepsis Sepsis Sepsis Sepsis Sepsis No Sepsis Sepsis Sepsis Sepsis Sepsis Sepsis

## 2022-05-05 NOTE — ED ADULT NURSE NOTE - NSHISCREENINGQ1_ED_A_ED
12 month old now post op (5/5) from craniosynostosis repair. NO complications reported in OR. On exam patient is irritable, consolable, heart rate regular, lungs clear, abdomen soft, cap refill <2, neuro intact and moving all extremities.     CBC 6 hours  no NSAIDs  tylenol ATC, oxy prn   Neuro checks  NSx on board No

## 2022-09-28 NOTE — DISCHARGE NOTE ADULT - DO YOU HAVE DIFFICULTY CLIMBING STAIRS
What Type Of Note Output Would You Prefer (Optional)?: Bullet Format Hpi Title: Evaluation of Skin Lesions Additional History: Has never had a FSS before. No

## 2022-10-18 ENCOUNTER — NON-APPOINTMENT (OUTPATIENT)
Age: 37
End: 2022-10-18

## 2022-10-21 ENCOUNTER — APPOINTMENT (OUTPATIENT)
Dept: OBGYN | Facility: CLINIC | Age: 37
End: 2022-10-21

## 2022-11-30 ENCOUNTER — ASOB RESULT (OUTPATIENT)
Age: 37
End: 2022-11-30

## 2022-11-30 ENCOUNTER — LABORATORY RESULT (OUTPATIENT)
Age: 37
End: 2022-11-30

## 2022-11-30 ENCOUNTER — APPOINTMENT (OUTPATIENT)
Dept: OBGYN | Facility: CLINIC | Age: 37
End: 2022-11-30

## 2022-11-30 VITALS
WEIGHT: 202.2 LBS | HEIGHT: 64 IN | BODY MASS INDEX: 34.52 KG/M2 | DIASTOLIC BLOOD PRESSURE: 87 MMHG | SYSTOLIC BLOOD PRESSURE: 122 MMHG | HEART RATE: 112 BPM

## 2022-11-30 DIAGNOSIS — N92.6 IRREGULAR MENSTRUATION, UNSPECIFIED: ICD-10-CM

## 2022-11-30 PROCEDURE — 99203 OFFICE O/P NEW LOW 30 MIN: CPT

## 2022-11-30 PROCEDURE — 76817 TRANSVAGINAL US OBSTETRIC: CPT

## 2022-12-01 LAB — HCG SERPL-MCNC: <1 MIU/ML

## 2022-12-02 LAB
TESTOST FREE SERPL-MCNC: 5 PG/ML
TESTOST SERPL-MCNC: 56.6 NG/DL

## 2022-12-08 LAB — ANTI-MUELLERIAN HORMONE: 9.88 NG/ML

## 2022-12-13 LAB — DHEA-SULFATE, SERUM: 252 UG/DL

## 2024-10-29 NOTE — BEHAVIORAL HEALTH ASSESSMENT NOTE - NSBHREFERLPTEAMNAME_PSY_A_CORE_FT
"CLINICAL NUTRITION SERVICES - REASSESSMENT NOTE     Nutrition Prescription    RECOMMENDATIONS FOR MDs/PROVIDERS TO ORDER:  None    Malnutrition Status:    Moderate in acute illness    RD Interventions  Pt continues to require a supplement to help meet estimated nutrition needs      Future/Additional Recommendations:  Adjust supplement pending intake, weight, tolerance, acceptance, labs       EVALUATION OF PROGRESS TOWARDS GOALS      CURRENT NUTRITION ORDERS  Diet: 1800 mL Fluid Restriction and Regular  Supplement: ensure enlive daily     Intake/Tolerance: good, eating % of meals with 1 meal in last 3 days at 50%    With ensure, 2-3 meal/day being ordered at 1311-5052 kcal, 59-80 g protein/day meeting % of estimated nutrition needs    Pt working on his ensure during visit. He states he doesn't mind it. He likes the current flavor being sent. Pt states nsg ordered too much for his breakfast and he ate \"too much\".     LABS  Labs reviewed- no concerns    MEDICATIONS  Medications reviewed  Lasix bid, levothyroxine, protonix, miralax daily, crestor, pericolace bid    ANTHROPOMETRICS  Weight History: + 2 bilateral UE and +3 bilateral LE, +4 right foot  Date/Time Weight Weight Method   10/23/24 0645 87 kg (191 lb 11.2 oz) Standing scale   10/22/24 0651 82.4 kg (181 lb 9.6 oz) Standing scale   10/21/24 0602 84.5 kg (186 lb 4.8 oz) Standing scale   10/19/24 0400 85.4 kg (188 lb 4.8 oz) Standing scale   10/18/24 2046 85.2 kg (187 lb 12.8 oz) Standing scale   10/17/24 0549 85.4 kg (188 lb 4.4 oz) Bed scale   10/16/24 1549 84.5 kg (186 lb 4.6 oz) Bed scale   10/15/24 0500 83.3 kg (183 lb 10.3 oz) Bed scale   10/14/24 0529 83 kg (182 lb 15.7 oz) Bed scale   10/12/24 1314 84.4 kg (186 lb) --     Wt Readings from Last 20 Encounters:   10/05/24 80.9 kg (178 lb 4.8 oz)   10/02/24 81.3 kg (179 lb 4.8 oz)   09/25/24 80.8 kg (178 lb 1.6 oz)   09/18/24 80.4 kg (177 lb 3.2 oz)   09/12/24 82.6 kg (182 lb 1.6 oz)   09/08/24 81.3 " kg (179 lb 3.2 oz)   07/29/24 83 kg (183 lb)   07/24/24 83.2 kg (183 lb 6.4 oz)   07/15/24 84.4 kg (186 lb)   07/08/24 83.9 kg (185 lb)   07/05/24 84.2 kg (185 lb 9.6 oz)   07/03/24 83.6 kg (184 lb 3.2 oz)   06/30/24 84 kg (185 lb 3 oz)   02/08/24 99.8 kg (220 lb)   09/19/23 88 kg (194 lb)     Dosing Weight: 72.2 kg    ASSESSED NUTRITION NEEDS  Estimated Energy Needs: 9007-9442 kcals/day (25 - 30 kcals/kg)  Justification: Maintenance  Estimated Protein Needs: 72-86 grams protein/day (1 - 1.2 grams of pro/kg)  Justification: Increased needs  Estimated Fluid Needs: 1800 mL/day  Justification: On a fluid restriction    PHYSICAL FINDINGS  See malnutrition section below.  -Surgical wound- hip incision with staples out yesterday-healing well per surgery  -Lymphedema  -1:1 nursing d/t frequent standing, restlessness  -2-3 L O2    GI - small loose today. 2 small-medium formed yesterday    MALNUTRITION:  % Weight Loss:  None noted  % Intake:  Decreased intake does not meet criteria for malnutrition   Subcutaneous Fat Loss:  None noted  Muscle Loss: UE moderate loss, deltoid mild loss  Fluid Retention:  Moderate bilateral LE, bilateral UE    Malnutrition Diagnosis: Moderate in acute illness    NUTRITION DIAGNOSIS  Inadequate oral intake related to current medical condition as evidenced by po intake < 50% of some meals  - resolved      Goals  Patient to consume % of nutritionally adequate meals three times per day, or the equivalent with supplements/snacks.- met     Monitoring/Evaluation  Progress toward goals will be monitored and evaluated per protocol.   Dr. Russo and Dr. Hollins (interns), Dr. West (attending)